# Patient Record
Sex: FEMALE | Race: WHITE | HISPANIC OR LATINO | URBAN - METROPOLITAN AREA
[De-identification: names, ages, dates, MRNs, and addresses within clinical notes are randomized per-mention and may not be internally consistent; named-entity substitution may affect disease eponyms.]

---

## 2017-09-01 ENCOUNTER — OUTPATIENT (OUTPATIENT)
Dept: OUTPATIENT SERVICES | Facility: HOSPITAL | Age: 37
LOS: 1 days | End: 2017-09-01
Payer: MEDICAID

## 2017-09-09 DIAGNOSIS — R69 ILLNESS, UNSPECIFIED: ICD-10-CM

## 2017-10-01 PROCEDURE — G9001: CPT

## 2020-03-05 ENCOUNTER — OUTPATIENT (OUTPATIENT)
Dept: OUTPATIENT SERVICES | Facility: HOSPITAL | Age: 40
LOS: 1 days | Discharge: HOME | End: 2020-03-05

## 2020-03-06 DIAGNOSIS — F11.20 OPIOID DEPENDENCE, UNCOMPLICATED: ICD-10-CM

## 2025-06-12 ENCOUNTER — EMERGENCY (EMERGENCY)
Facility: HOSPITAL | Age: 45
LOS: 1 days | End: 2025-06-12
Attending: EMERGENCY MEDICINE
Payer: COMMERCIAL

## 2025-06-12 VITALS
RESPIRATION RATE: 18 BRPM | DIASTOLIC BLOOD PRESSURE: 66 MMHG | OXYGEN SATURATION: 100 % | SYSTOLIC BLOOD PRESSURE: 101 MMHG | HEART RATE: 47 BPM | WEIGHT: 110.01 LBS | TEMPERATURE: 98 F

## 2025-06-12 LAB
ALBUMIN SERPL ELPH-MCNC: 3.1 G/DL — LOW (ref 3.3–5.2)
ALP SERPL-CCNC: 51 U/L — SIGNIFICANT CHANGE UP (ref 40–120)
ALT FLD-CCNC: 7 U/L — SIGNIFICANT CHANGE UP
ANION GAP SERPL CALC-SCNC: 12 MMOL/L — SIGNIFICANT CHANGE UP (ref 5–17)
AST SERPL-CCNC: 14 U/L — SIGNIFICANT CHANGE UP
BASOPHILS # BLD AUTO: 0.03 K/UL — SIGNIFICANT CHANGE UP (ref 0–0.2)
BASOPHILS NFR BLD AUTO: 0.5 % — SIGNIFICANT CHANGE UP (ref 0–2)
BILIRUB SERPL-MCNC: 0.4 MG/DL — SIGNIFICANT CHANGE UP (ref 0.4–2)
BLD GP AB SCN SERPL QL: SIGNIFICANT CHANGE UP
BUN SERPL-MCNC: 12.7 MG/DL — SIGNIFICANT CHANGE UP (ref 8–20)
CALCIUM SERPL-MCNC: 8.7 MG/DL — SIGNIFICANT CHANGE UP (ref 8.4–10.5)
CHLORIDE SERPL-SCNC: 92 MMOL/L — LOW (ref 96–108)
CO2 SERPL-SCNC: 31 MMOL/L — HIGH (ref 22–29)
CREAT SERPL-MCNC: 0.64 MG/DL — SIGNIFICANT CHANGE UP (ref 0.5–1.3)
EGFR: 113 ML/MIN/1.73M2 — SIGNIFICANT CHANGE UP
EGFR: 113 ML/MIN/1.73M2 — SIGNIFICANT CHANGE UP
EOSINOPHIL # BLD AUTO: 0.03 K/UL — SIGNIFICANT CHANGE UP (ref 0–0.5)
EOSINOPHIL NFR BLD AUTO: 0.5 % — SIGNIFICANT CHANGE UP (ref 0–6)
GLUCOSE SERPL-MCNC: 113 MG/DL — HIGH (ref 70–99)
HCG SERPL QL: NEGATIVE — SIGNIFICANT CHANGE UP
HCT VFR BLD CALC: 28.8 % — LOW (ref 34.5–45)
HGB BLD-MCNC: 9.2 G/DL — LOW (ref 11.5–15.5)
IMM GRANULOCYTES # BLD AUTO: 0.03 K/UL — SIGNIFICANT CHANGE UP (ref 0–0.07)
IMM GRANULOCYTES NFR BLD AUTO: 0.5 % — SIGNIFICANT CHANGE UP (ref 0–0.9)
LYMPHOCYTES # BLD AUTO: 0.57 K/UL — LOW (ref 1–3.3)
LYMPHOCYTES NFR BLD AUTO: 9.8 % — LOW (ref 13–44)
MAGNESIUM SERPL-MCNC: 1.9 MG/DL — SIGNIFICANT CHANGE UP (ref 1.6–2.6)
MCHC RBC-ENTMCNC: 25.8 PG — LOW (ref 27–34)
MCHC RBC-ENTMCNC: 31.9 G/DL — LOW (ref 32–36)
MCV RBC AUTO: 80.7 FL — SIGNIFICANT CHANGE UP (ref 80–100)
MONOCYTES # BLD AUTO: 0.28 K/UL — SIGNIFICANT CHANGE UP (ref 0–0.9)
MONOCYTES NFR BLD AUTO: 4.8 % — SIGNIFICANT CHANGE UP (ref 2–14)
NEUTROPHILS # BLD AUTO: 4.88 K/UL — SIGNIFICANT CHANGE UP (ref 1.8–7.4)
NEUTROPHILS NFR BLD AUTO: 83.9 % — HIGH (ref 43–77)
NRBC # BLD AUTO: 0 K/UL — SIGNIFICANT CHANGE UP (ref 0–0)
NRBC # FLD: 0 K/UL — SIGNIFICANT CHANGE UP (ref 0–0)
NRBC BLD AUTO-RTO: 0 /100 WBCS — SIGNIFICANT CHANGE UP (ref 0–0)
NT-PROBNP SERPL-SCNC: 292 PG/ML — SIGNIFICANT CHANGE UP (ref 0–300)
PHOSPHATE SERPL-MCNC: 2.9 MG/DL — SIGNIFICANT CHANGE UP (ref 2.4–4.7)
PLATELET # BLD AUTO: 333 K/UL — SIGNIFICANT CHANGE UP (ref 150–400)
PMV BLD: 10.2 FL — SIGNIFICANT CHANGE UP (ref 7–13)
POTASSIUM SERPL-MCNC: 3 MMOL/L — LOW (ref 3.5–5.3)
POTASSIUM SERPL-SCNC: 3 MMOL/L — LOW (ref 3.5–5.3)
PROT SERPL-MCNC: 6.5 G/DL — LOW (ref 6.6–8.7)
RBC # BLD: 3.57 M/UL — LOW (ref 3.8–5.2)
RBC # FLD: 14.3 % — SIGNIFICANT CHANGE UP (ref 10.3–14.5)
SODIUM SERPL-SCNC: 135 MMOL/L — SIGNIFICANT CHANGE UP (ref 135–145)
TROPONIN T, HIGH SENSITIVITY RESULT: 11 NG/L — SIGNIFICANT CHANGE UP (ref 0–51)
TROPONIN T, HIGH SENSITIVITY RESULT: 11 NG/L — SIGNIFICANT CHANGE UP (ref 0–51)
WBC # BLD: 5.82 K/UL — SIGNIFICANT CHANGE UP (ref 3.8–10.5)
WBC # FLD AUTO: 5.82 K/UL — SIGNIFICANT CHANGE UP (ref 3.8–10.5)

## 2025-06-12 PROCEDURE — 99223 1ST HOSP IP/OBS HIGH 75: CPT

## 2025-06-12 PROCEDURE — 93010 ELECTROCARDIOGRAM REPORT: CPT

## 2025-06-12 PROCEDURE — 71045 X-RAY EXAM CHEST 1 VIEW: CPT | Mod: 26

## 2025-06-12 PROCEDURE — 93306 TTE W/DOPPLER COMPLETE: CPT | Mod: 26

## 2025-06-12 RX ORDER — MAGNESIUM OXIDE 400 MG
400 TABLET ORAL ONCE
Refills: 0 | Status: COMPLETED | OUTPATIENT
Start: 2025-06-12 | End: 2025-06-12

## 2025-06-12 RX ORDER — SODIUM CHLORIDE 9 G/1000ML
1000 INJECTION, SOLUTION INTRAVENOUS ONCE
Refills: 0 | Status: COMPLETED | OUTPATIENT
Start: 2025-06-12 | End: 2025-06-12

## 2025-06-12 RX ADMIN — SODIUM CHLORIDE 1000 MILLILITER(S): 9 INJECTION, SOLUTION INTRAVENOUS at 17:01

## 2025-06-12 RX ADMIN — Medication 400 MILLIGRAM(S): at 17:01

## 2025-06-12 RX ADMIN — Medication 100 MILLIEQUIVALENT(S): at 17:01

## 2025-06-12 RX ADMIN — Medication 100 MILLIEQUIVALENT(S): at 12:25

## 2025-06-12 RX ADMIN — Medication 100 MILLIEQUIVALENT(S): at 18:10

## 2025-06-12 RX ADMIN — Medication 40 MILLIEQUIVALENT(S): at 12:22

## 2025-06-12 NOTE — ED PROVIDER NOTE - CLINICAL SUMMARY MEDICAL DECISION MAKING FREE TEXT BOX
43 yo F hx bulmia, gastric bypass, CAD s/p MI, anemia, substance abuse presenting with syncope. Plan to r/o electrolyte abnormalities v anemia v acs v hypothyroidism. Plan for labs, cxr, meds.

## 2025-06-12 NOTE — ED CDU PROVIDER INITIAL DAY NOTE - ATTENDING APP SHARED VISIT CONTRIBUTION OF CARE
44 year old female with pmhx bulemia, here for syncope.  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  She admits to prior episodes of syncope in past.  Family history father with CABG  Denies smoking.    Seen by EP advises rpt echo with definity due to limited findings on echo performed while in ED.    pt comfortable and feeling well at time of cdu admission

## 2025-06-12 NOTE — ED ADULT NURSE REASSESSMENT NOTE - COMFORT CARE
assisted to bathroom/ambulated to bathroom/darkened lights/meal provided/po fluids offered/treatment delay explained/wait time explained/warm blanket provided

## 2025-06-12 NOTE — ED ADULT NURSE NOTE - NSFALLRISKINTERV_ED_ALL_ED
Assistance OOB with selected safe patient handling equipment if applicable/Communicate fall risk and risk factors to all staff, patient, and family/Orthostatic vital signs/Provide visual cue: yellow wristband, yellow gown, etc/Reinforce activity limits and safety measures with patient and family/Call bell, personal items and telephone in reach/Instruct patient to call for assistance before getting out of bed/chair/stretcher/Non-slip footwear applied when patient is off stretcher/[place] to call system/Physically safe environment - no spills, clutter or unnecessary equipment/Purposeful Proactive Rounding/Room/bathroom lighting operational, light cord in reach

## 2025-06-12 NOTE — CONSULT NOTE ADULT - SUBJECTIVE AND OBJECTIVE BOX
[place] [facility]  [facility]   Office: [address] [place] [address]  Telephone: [phone] Fax:[phone]      HPI:    Pt is a 45 y/o female, PMHx significant for bulimia, anemia (prior PRBC transfusion), gastric sleeve, NSTEMI (2022), nonobstructive CAD, HFrEF (45%), who presents to [facility] with complaints of syncope. Pt states she was pushing a stretcher at work prior to arrival when she felt her vision go black and she woke up on the floor. Pt states the episode lasted only a couple of seconds and she quickly regained consciousness. Pt reports a similar episode approximately 7 years ago in the setting of anemia, for which she required a blood transfusion. Pt states she woke up this morning feeling weak and has had several episodes of vomiting throughout the past week. Pt reports no other recent or remote syncopal events. Of note, pt reports no cardiology follow up since her NSTEMI in 2022. Pt had a cMRI at the time which revealed a mildly dilated LV, although the study was limited due to lack of IV gadolinium. Pt currently reports no complaints at time of assessment. Denies chest pain, SOB, palpitations dizziness.      Telemetry: Sinus bradycardia / sinus rhythm with intact conduction.  EKG: Sinus bradycardia with intact conduction. QRS 100ms.         PAST MEDICAL & SURGICAL HISTORY:      REVIEW OF SYSTEMS:    CONSTITUTIONAL: No fever, weight loss, or fatigue  EYES: No visual disturbances  NECK: No pain or stiffness  RESPIRATORY: No cough, wheezing, chills or hemoptysis; No shortness of breath  CARDIOVASCULAR: see HPI  GASTROINTESTINAL: + vomiting  NEUROLOGICAL: No headaches, memory loss, loss of strength, numbness, or tremors      MEDICATIONS  (STANDING):    MEDICATIONS  (PRN):      Allergies    No Known Allergies    Intolerances        SOCIAL HISTORY:  Former smoker      FAMILY HISTORY:      Vital Signs Last 24 Hrs  T(C): 36.1 (12 Jun 2025 10:46), Max: 36.6 (12 Jun 2025 09:55)  T(F): 97 (12 Jun 2025 10:46), Max: 97.8 (12 Jun 2025 09:55)  HR: 47 (12 Jun 2025 10:46) (47 - 47)  BP: 90/62 (12 Jun 2025 10:46) (90/62 - 101/66)  BP(mean): --  RR: 18 (12 Jun 2025 10:46) (18 - 18)  SpO2: 100% (12 Jun 2025 10:46) (100% - 100%)    Parameters below as of 12 Jun 2025 09:55  Patient On (Oxygen Delivery Method): room air          Physical Exam:    Constitutional: NAD  Chest wall: normal in appearance, nontender to palpation  Resp: effort normal, breath sounds clear to auscultation bilaterally  Cardiac: Bradycardic. S1/S2.  Abdomen: soft, nondistended, nontender to palpation in all four quadrants    Neuro: Alert and oriented x 3          LABS:                        9.2    5.82  )-----------( 333      ( 12 Jun 2025 10:40 )             28.8   06-12    135  |  92[L]  |  12.7  ----------------------------<  113[H]  3.0[L]   |  31.0[H]  |  0.64    Ca    8.7      12 Jun 2025 10:40  Phos  2.9     06-12  Mg     1.9     06-12    TPro  6.5[L]  /  Alb  3.1[L]  /  TBili  0.4  /  DBili  x   /  AST  14  /  ALT  7   /  AlkPhos  51  06-12  LIVER FUNCTIONS - ( 12 Jun 2025 10:40 )  Alb: 3.1 g/dL / Pro: 6.5 g/dL / ALK PHOS: 51 U/L / ALT: 7 U/L / AST: 14 U/L / GGT: x               Urinalysis Basic - ( 12 Jun 2025 10:40 )    Color: x / Appearance: x / SG: x / pH: x  Gluc: 113 mg/dL / Ketone: x  / Bili: x / Urobili: x   Blood: x / Protein: x / Nitrite: x   Leuk Esterase: x / RBC: x / WBC x   Sq Epi: x / Non Sq Epi: x / Bacteria: x          Assessment:     Pt is a 45 y/o female, PMHx significant for bulimia, anemia (prior PRBC transfusion), gastric sleeve, NSTEMI (2022), nonobstructive CAD, HFrEF (45%), who presents to [facility] with complaints of syncope. Pt states she was pushing a stretcher at work prior to arrival when she felt her vision go black and she woke up on the floor. Pt states the episode lasted only a couple of seconds and she quickly regained consciousness. Pt reports a similar episode approximately 7 years ago in the setting of anemia, for which she required a blood transfusion. Pt states she woke up this morning feeling weak and has had several episodes of vomiting throughout the past week. Pt reports no other recent or remote syncopal events. Of note, pt reports no cardiology follow up since her NSTEMI in 2022. Pt had a cMRI at the time which revealed a mildly dilated LV, although the study was limited due to lack of IV gadolinium. Pt currently reports no complaints at time of assessment. Denies chest pain, SOB, palpitations dizziness.      Plan:  1) Syncope  - Sinus bradycardia with intact conduction on telemetry review  - No baseline conduction abnormality with narrow QRS  - No high-grade AVB on telemetry  - Hypokalemic in the setting of vomiting. Likely dehydration component  - Maintain K > 4 and Mg > 2  - Obtain TTE given history of reduced EF   - Trops x 2 WNL  - Telemetry monitoring     Will discuss with Dr. Mir, further recommendations to follow

## 2025-06-12 NOTE — ED PROVIDER NOTE - PHYSICAL EXAMINATION
VITAL SIGNS: I have reviewed nursing notes and confirm.  CONSTITUTIONAL: cachectic, pale, poor dentition.   SKIN: Skin exam is warm and dry, no acute rash.  HEAD: Normocephalic; atraumatic.  EYES: PERRL, EOM intact; conjunctiva and sclera clear.  ENT: No nasal discharge; airway clear. Throat clear.  NECK: Supple; non tender.    CARD: bradycardic and regular rhythm.   RESP: No wheezes,  no rales or rhonchi.   ABD:  soft; non-distended; non-tender;   EXT: Normal ROM. No clubbing, cyanosis or edema.  NEURO: Alert, oriented. Grossly unremarkable. No focal deficits.  moves all extremities.   PSYCH: Cooperative, appropriate.

## 2025-06-12 NOTE — ED CDU PROVIDER INITIAL DAY NOTE - OBJECTIVE STATEMENT
44 year old female with pmhx bulemia, here for syncope.  Seen by EP advises rpt echo with definity. 44 year old female with pmhx bulemia, here for syncope.  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  She admits to prior episodes of syncope in past.  Family history father with CABG  Denies smoking.    Seen by EP advises rpt echo with definity due to limited findings on echo performed while in ED.

## 2025-06-12 NOTE — ED ADULT TRIAGE NOTE - CHIEF COMPLAINT QUOTE
BIBA from work for syncope. + HS, - blood thinners. PMH MI no stents, and anemia. Pt now awake and alert, FS and EKG in prog

## 2025-06-12 NOTE — ED PROVIDER NOTE - ST/T WAVE
Reminded patient of scheduled procedure on 2/4  Instructions given and COVID questionnaire completed.
no st elevations.

## 2025-06-12 NOTE — ED ADULT NURSE NOTE - OBJECTIVE STATEMENT
Pt c/o weakness and lethargy starting this am. States she was at work this morning pushing a stretcher and had a syncopal episode. Unsure if she hit her head. - thinners. HR currently jordi in the 40s. States she had a low HR the last time she needed a blood transfusion. Hx of anemia. Denies cp, sob.

## 2025-06-12 NOTE — ED CDU PROVIDER INITIAL DAY NOTE - NS ED ROS FT
No fever/chills, No photophobia/eye pain/changes in vision, No ear pain/sore throat/dysphagia, No chest pain/palpitations, no SOB/cough/wheeze/stridor, No abdominal pain, No N/V/D, no dysuria/frequency/discharge, No neck/back pain, no rash, +syncope.

## 2025-06-12 NOTE — ED PROVIDER NOTE - OBJECTIVE STATEMENT
43 yo F hx bulmia, gastric bypass, CAD s/p MI, anemia, substance abuse presenting with syncope. Patient states she was pushing a stretcher at work when she felt her vision going black and then woke up on the floor. States this has happened in the past and she needed a blood transfusion. States she has suffered from severe malnourishment since the bulmia but hasn't induced vomiting recently. Denies any symptoms before the syncope. Denies chest pain, SOB, N/V/D, abdominal pain, dizziness, hematuria, melena, fever, chills. States she smokes vape. Denies alc, drug use.

## 2025-06-12 NOTE — ED CDU PROVIDER INITIAL DAY NOTE - CLINICAL SUMMARY MEDICAL DECISION MAKING FREE TEXT BOX
syncope  seen by EP  underwent echo, limited  recommends rpt with definity syncope, has h/o bulmia  seen by EP  underwent echo, limited  recommends rpt with definity  keep on cardiac monitor  restart home medication pantoprazole  rpt labwork in AM

## 2025-06-12 NOTE — ED PROVIDER NOTE - ATTENDING CONTRIBUTION TO CARE
I, Rod Amato, performed a face to face bedside interview with this patient regarding history of present illness, and completed an independent physical examination. I personally made/approved the management plan and take responsibility for the patient management. I have communicated the patient’s plan of care and disposition with the ACP.  44 year old female with PMH bulimia, NSTEMI in 2022 with no stents presents with syncope. pt states that she was pushing a stretcher at work and passed out. No LOC. She denies chest pain  Gen: NAD, well appearing  CV: RRR  Pul: CTA b/l  Abd: Soft, non-distended, non-tender  Neuro: no focal deficits  Pt with sinus jordi, improved after electrolytes, placed on obs for tele and repeat echo with daffinity

## 2025-06-13 VITALS
SYSTOLIC BLOOD PRESSURE: 116 MMHG | HEART RATE: 81 BPM | RESPIRATION RATE: 18 BRPM | TEMPERATURE: 99 F | DIASTOLIC BLOOD PRESSURE: 77 MMHG | OXYGEN SATURATION: 99 %

## 2025-06-13 LAB
ANION GAP SERPL CALC-SCNC: 10 MMOL/L — SIGNIFICANT CHANGE UP (ref 5–17)
BUN SERPL-MCNC: 11.2 MG/DL — SIGNIFICANT CHANGE UP (ref 8–20)
CALCIUM SERPL-MCNC: 8.5 MG/DL — SIGNIFICANT CHANGE UP (ref 8.4–10.5)
CHLORIDE SERPL-SCNC: 101 MMOL/L — SIGNIFICANT CHANGE UP (ref 96–108)
CO2 SERPL-SCNC: 29 MMOL/L — SIGNIFICANT CHANGE UP (ref 22–29)
CREAT SERPL-MCNC: 0.55 MG/DL — SIGNIFICANT CHANGE UP (ref 0.5–1.3)
EGFR: 117 ML/MIN/1.73M2 — SIGNIFICANT CHANGE UP
EGFR: 117 ML/MIN/1.73M2 — SIGNIFICANT CHANGE UP
GLUCOSE SERPL-MCNC: 108 MG/DL — HIGH (ref 70–99)
HCT VFR BLD CALC: 26.6 % — LOW (ref 34.5–45)
HGB BLD-MCNC: 8.5 G/DL — LOW (ref 11.5–15.5)
MAGNESIUM SERPL-MCNC: 1.8 MG/DL — SIGNIFICANT CHANGE UP (ref 1.6–2.6)
MCHC RBC-ENTMCNC: 25.5 PG — LOW (ref 27–34)
MCHC RBC-ENTMCNC: 32 G/DL — SIGNIFICANT CHANGE UP (ref 32–36)
MCV RBC AUTO: 79.9 FL — LOW (ref 80–100)
NRBC # BLD AUTO: 0 K/UL — SIGNIFICANT CHANGE UP (ref 0–0)
NRBC # FLD: 0 K/UL — SIGNIFICANT CHANGE UP (ref 0–0)
NRBC BLD AUTO-RTO: 0 /100 WBCS — SIGNIFICANT CHANGE UP (ref 0–0)
PLATELET # BLD AUTO: 289 K/UL — SIGNIFICANT CHANGE UP (ref 150–400)
PMV BLD: 10.6 FL — SIGNIFICANT CHANGE UP (ref 7–13)
POTASSIUM SERPL-MCNC: 3.8 MMOL/L — SIGNIFICANT CHANGE UP (ref 3.5–5.3)
POTASSIUM SERPL-SCNC: 3.8 MMOL/L — SIGNIFICANT CHANGE UP (ref 3.5–5.3)
RBC # BLD: 3.33 M/UL — LOW (ref 3.8–5.2)
RBC # FLD: 14.3 % — SIGNIFICANT CHANGE UP (ref 10.3–14.5)
SODIUM SERPL-SCNC: 140 MMOL/L — SIGNIFICANT CHANGE UP (ref 135–145)
WBC # BLD: 6.46 K/UL — SIGNIFICANT CHANGE UP (ref 3.8–10.5)
WBC # FLD AUTO: 6.46 K/UL — SIGNIFICANT CHANGE UP (ref 3.8–10.5)

## 2025-06-13 PROCEDURE — 86900 BLOOD TYPING SEROLOGIC ABO: CPT

## 2025-06-13 PROCEDURE — 80053 COMPREHEN METABOLIC PANEL: CPT

## 2025-06-13 PROCEDURE — C8929: CPT

## 2025-06-13 PROCEDURE — 86901 BLOOD TYPING SEROLOGIC RH(D): CPT

## 2025-06-13 PROCEDURE — 80048 BASIC METABOLIC PNL TOTAL CA: CPT

## 2025-06-13 PROCEDURE — 84436 ASSAY OF TOTAL THYROXINE: CPT

## 2025-06-13 PROCEDURE — 96374 THER/PROPH/DIAG INJ IV PUSH: CPT | Mod: XU

## 2025-06-13 PROCEDURE — 85027 COMPLETE CBC AUTOMATED: CPT

## 2025-06-13 PROCEDURE — 99238 HOSP IP/OBS DSCHRG MGMT 30/<: CPT

## 2025-06-13 PROCEDURE — 93306 TTE W/DOPPLER COMPLETE: CPT

## 2025-06-13 PROCEDURE — 85025 COMPLETE CBC W/AUTO DIFF WBC: CPT

## 2025-06-13 PROCEDURE — 99285 EMERGENCY DEPT VISIT HI MDM: CPT | Mod: 25

## 2025-06-13 PROCEDURE — G0378: CPT

## 2025-06-13 PROCEDURE — 84703 CHORIONIC GONADOTROPIN ASSAY: CPT

## 2025-06-13 PROCEDURE — 82962 GLUCOSE BLOOD TEST: CPT

## 2025-06-13 PROCEDURE — 93306 TTE W/DOPPLER COMPLETE: CPT | Mod: 26

## 2025-06-13 PROCEDURE — 83880 ASSAY OF NATRIURETIC PEPTIDE: CPT

## 2025-06-13 PROCEDURE — 96376 TX/PRO/DX INJ SAME DRUG ADON: CPT | Mod: XU

## 2025-06-13 PROCEDURE — 93010 ELECTROCARDIOGRAM REPORT: CPT

## 2025-06-13 PROCEDURE — 86850 RBC ANTIBODY SCREEN: CPT

## 2025-06-13 PROCEDURE — 36415 COLL VENOUS BLD VENIPUNCTURE: CPT

## 2025-06-13 PROCEDURE — 71045 X-RAY EXAM CHEST 1 VIEW: CPT

## 2025-06-13 PROCEDURE — 84100 ASSAY OF PHOSPHORUS: CPT

## 2025-06-13 PROCEDURE — 84484 ASSAY OF TROPONIN QUANT: CPT

## 2025-06-13 PROCEDURE — 84443 ASSAY THYROID STIM HORMONE: CPT

## 2025-06-13 PROCEDURE — 83735 ASSAY OF MAGNESIUM: CPT

## 2025-06-13 PROCEDURE — 93005 ELECTROCARDIOGRAM TRACING: CPT

## 2025-06-13 RX ORDER — MAGNESIUM SULFATE 500 MG/ML
2 SYRINGE (ML) INJECTION ONCE
Refills: 0 | Status: ACTIVE | OUTPATIENT
Start: 2025-06-13

## 2025-06-13 RX ORDER — ONDANSETRON HCL/PF 4 MG/2 ML
4 VIAL (ML) INJECTION ONCE
Refills: 0 | Status: ACTIVE | OUTPATIENT
Start: 2025-06-13

## 2025-06-13 RX ADMIN — Medication 40 MILLIEQUIVALENT(S): at 15:25

## 2025-06-13 NOTE — ED CDU PROVIDER DISPOSITION NOTE - CLINICAL COURSE
45yo F PMHx bulimia, anemia, gastric sleeve, NSTEMI (2022), nonobstructive CAD, HFrEF (45%) presented to ED for evaluation of syncope. EKG sinus jordi. Labs notable for mild hypokalemia 3.0, repleted, K 3.8 on rpt labs. Trop stable x 2. Pt placed in obs for tele monitoring and TTE. EP consulted for bradycardia. TTE technically difficulty study, pt remained in obs for tele monitoring and rpt TTE. Sinus bradycardia on monitor however HR improves when awake and ambulating. Spoke with EP, pt with adequate chronotropic response with ambulation with sinus rates reaching 70 BPM and TTE with LVEF of 45-50%, unchanged compared to 1/14/22. Pt had brief episode of nausea and vomited x 1 but symptoms resolved prior to receiving zofran. EP signing off, recommending outpt f/u with cardiology Dr. Vidal for MCOT. All results and plan of care reviewed with pt. Pt provided copy of all results. Return precautions discussed. Pt verbalized understanding of all results, treatment plan, follow-up instructions and reasons to return to ED.

## 2025-06-13 NOTE — ED CDU PROVIDER DISPOSITION NOTE - NSFOLLOWUPINSTRUCTIONS_ED_ALL_ED_FT
- Return to the ED for any new or worsening symptoms.   - Follow-up with Dr. Vidal, cardiology in office     Syncope    Syncope is when you temporarily lose consciousness, also called fainting or passing out. It is caused by a sudden decrease in blood flow to the brain. Even though most causes of syncope are not dangerous, syncope can possibly be a sign of a serious medical problem. Signs that you may be about to faint include feeling dizzy, lightheaded, nausea, visual changes, or cold/clammy skin. Do not drive, operate heavy machinery, or play sports until your health care provider says it is okay.    SEEK IMMEDIATE MEDICAL CARE IF YOU HAVE ANY OF THE FOLLOWING SYMPTOMS: severe headache, pain in your chest/abdomen/back, bleeding from your mouth or rectum, palpitations, shortness of breath, pain with breathing, seizure, confusion, or trouble walking.

## 2025-06-13 NOTE — PROGRESS NOTE ADULT - SUBJECTIVE AND OBJECTIVE BOX
Subjective: No acute events documented overnight. Pt reports feeling "tired" no other complaints.       TELE: Sinus bradycardia with intact conduction. Bradycardic to 30s while sleeping. Ventricular rates 70s during ambulation.       MEDICATIONS  (STANDING):  magnesium sulfate  IVPB 2 Gram(s) IV Intermittent once  pantoprazole    Tablet 40 milliGRAM(s) Oral before breakfast  potassium chloride    Tablet ER 40 milliEquivalent(s) Oral once    MEDICATIONS  (PRN):      Allergies    No Known Allergies    Intolerances        Vital Signs Last 24 Hrs  T(C): 36.3 (13 Jun 2025 11:00), Max: 36.7 (12 Jun 2025 15:37)  T(F): 97.4 (13 Jun 2025 11:00), Max: 98 (12 Jun 2025 15:37)  HR: 39 (13 Jun 2025 11:00) (39 - 53)  BP: 102/67 (13 Jun 2025 11:00) (93/57 - 104/67)  BP(mean): --  RR: 12 (13 Jun 2025 11:00) (12 - 19)  SpO2: 100% (13 Jun 2025 11:00) (98% - 100%)    Parameters below as of 13 Jun 2025 11:00  Patient On (Oxygen Delivery Method): room air        Physical Exam:  Constitutional: NAD  Resp: effort normal, breath sounds clear to auscultation bilaterally  Cardiac: Heart regular rate and rhythm, no murmurs, rubs, or gallops.  No edema.  Neuro: Alert and oriented x 3,    LABS:                        8.5    6.46  )-----------( 289      ( 13 Jun 2025 03:25 )             26.6     06-13    140  |  101  |  11.2  ----------------------------<  108[H]  3.8   |  29.0  |  0.55    Ca    8.5      13 Jun 2025 03:25  Phos  2.9     06-12  Mg     1.8     06-13    TPro  6.5[L]  /  Alb  3.1[L]  /  TBili  0.4  /  DBili  x   /  AST  14  /  ALT  7   /  AlkPhos  51  06-12      Urinalysis Basic - ( 13 Jun 2025 03:25 )    Color: x / Appearance: x / SG: x / pH: x  Gluc: 108 mg/dL / Ketone: x  / Bili: x / Urobili: x   Blood: x / Protein: x / Nitrite: x   Leuk Esterase: x / RBC: x / WBC x   Sq Epi: x / Non Sq Epi: x / Bacteria: x        RADIOLOGY & ADDITIONAL TESTS:      < from: TTE W or WO Ultrasound Enhancing Agent (06.13.25 @ 09:10) >   1. Left ventricular cavity is normal in size. Left ventricular systolic function is mildly decreased with an ejection fraction visually estimated at 45 to 50 %.   2. Normal right ventricular systolicfunction. Tricuspid annular plane systolic excursion (TAPSE) is 2.0 cm (normal >=1.7 cm).   3. Left atrium is normal in size.   4. The right atrium is normal in size.   5. The inferior vena cava is dilated (dilated >2.1cm) with normal inspiratory collapse (normal >50%) consistent with mildly elevated right atrial pressure (~8, range 5-10mmHg).   6. Trace tricuspid regurgitation.    < end of copied text >        Assessment:      45 y/o female, PMHx significant for bulimia, anemia (prior PRBC transfusion), gastric sleeve, NSTEMI (2022), nonobstructive CAD, HFrEF (45%), who presents to [facility] with complaints of syncope. Pt states she was pushing a stretcher at work prior to arrival when she felt her vision go black and she woke up on the floor. Pt states the episode lasted only a couple of seconds and she quickly regained consciousness. Pt reports a similar episode approximately 7 years ago in the setting of anemia, for which she required a blood transfusion. Pt states she woke up this morning feeling weak and has had several episodes of vomiting throughout the past week. Pt reports no other recent or remote syncopal events. Of note, pt reports no cardiology follow up since her NSTEMI in 2022. Pt had a cMRI at the time which revealed a mildly dilated LV, although the study was limited due to lack of IV gadolinium. Pt currently reports no complaints at time of assessment. Denies chest pain, SOB, palpitations dizziness.      Recommendations:  - Pt with persistent sinus bradycardia with no evidence of high grade AVB or significant pauses  - Pt with adequate chronotropic  response with ambulation with sinus rates reaching 70 BPM  - TTE with LVEF of 45-50%. No change from TTE from 1/14/22  - No indication for PPM implant at this time  - Will arrange for 14 day MCOT monitor at time of discharge  - Pt needs to establish outpatient general cardiology follow up for management of NICM. Can follow up with Dr. Vidal  - No further inpatient EP intervention required at this time    Above discussed with Dr. Mir

## 2025-06-13 NOTE — ED CDU PROVIDER DISPOSITION NOTE - ATTENDING CONTRIBUTION TO CARE
I agree with the PA's note and was available for any issues/concerns. I was directly involved in patient care. My brief overall assessment is as follows: hx gastric sleeve, bulemia with syncope, had echo, EF 45%, jordi but responds when not laying and is up, seen by EP/cards, cleared, feeling improved. return precautions. HD stable.

## 2025-06-13 NOTE — ED ADULT NURSE REASSESSMENT NOTE - NS ED NURSE REASSESS COMMENT FT1
Assumed care of patient from AM RN and patient on potassium infusion, NSR on cardiac monitor and pt denies pain at this time. patient informed plan of care and states understanding.
Continue to be Sinus Wilbert  39-51-.44-67, sustain 35 39 Pt states that normal for her. Pt remains asymptomatic  EVY soto informed and aware .Stat EKG and labs done .Refused 6AM labs EVY soto informed and aware
Report given to PAT Whipple and patient moved to A.
pt alert and awake, SB on tele monitor HR 30's -PA Isrrael made aware, no c/o pain at this time, oob independently, pt pending EP to review echo
pt alert and awake, oob independently, pt discharged to home discharge paperwork reviewed questions answered IV removed and pt wheelchaired to ED main lobby
recv'd pt at 0700, pt a&ox4, no c/o pain at this time, pt SB on tele monitor HR 30's, pt pending echo for today
Received patient from abran RN.  Pt AxO4, VSS.  Pt denies chest pain/SOB at this time.  Cardio NSR on cardiac monitor.   IV insertion site intact  with no sign of infiltration noted -, flushing without difficulty. Currently denies chest pain Continue to be on CM with sinus jordi EVY soto I formed and awarre Denies chills fever ,palpitation or any discomfort Support provided safety maintained will continue to monitor closely.For Echo in AM Safety measures taken, bed in low position, call bell within reach, side rails up x2.  Plan of care explained.  Pt verbalized understanding.  Will continue to monitor.

## 2025-06-13 NOTE — ED CDU PROVIDER DISPOSITION NOTE - CARE PROVIDER_API CALL
Wallace Vidal  Cardiovascular Disease  [address] [facility], [address] [address]  Phone: [phone]  Fax: [phone]  Follow Up Time:

## 2025-06-13 NOTE — ED CDU PROVIDER DISPOSITION NOTE - PATIENT PORTAL LINK FT
You can access the FollowMyHealth Patient Portal offered by [facility] by registering at the following website: [email or web address]. By joining [employer]’s FollowMyHealth portal, you will also be able to view your health information using other applications (apps) compatible with our system.

## 2025-06-13 NOTE — ED CDU PROVIDER SUBSEQUENT DAY NOTE - CLINICAL SUMMARY MEDICAL DECISION MAKING FREE TEXT BOX
44 year old female, PMHx significant for bulimia, anemia, gastric sleeve, NSTEMI (2022), nonobstructive CAD, HFrEF (45%), who presented to ED for syncope. 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 pt sinus jordi, mild hypokalemia 3.0, repleted 3.8 troponin 11-11. EP consulted pending TTE repeat with contrast.

## 2025-06-13 NOTE — ED CDU PROVIDER SUBSEQUENT DAY NOTE - ATTENDING APP SHARED VISIT CONTRIBUTION OF CARE
I agree with the PA's note and was available for any issues/concerns. I was directly involved in patient care. My brief overall assessment is as follows: bradycardic ~40, feeling tired. awaiting EP and repeat echo.

## 2025-06-13 NOTE — ED CDU PROVIDER SUBSEQUENT DAY NOTE - PROGRESS NOTE DETAILS
Pt evaluated on AM rounds, resting comfortably. HR 30s on monitor, sinus jordi, pt asymptomatic. awaiting rpt TTE, EP following